# Patient Record
Sex: FEMALE | Race: BLACK OR AFRICAN AMERICAN | Employment: FULL TIME | URBAN - METROPOLITAN AREA
[De-identification: names, ages, dates, MRNs, and addresses within clinical notes are randomized per-mention and may not be internally consistent; named-entity substitution may affect disease eponyms.]

---

## 2020-02-21 ENCOUNTER — HOSPITAL ENCOUNTER (EMERGENCY)
Age: 65
Discharge: HOME | End: 2020-02-21
Attending: EMERGENCY MEDICINE
Payer: COMMERCIAL

## 2020-02-21 ENCOUNTER — APPOINTMENT (OUTPATIENT)
Dept: GENERAL RADIOLOGY | Age: 65
End: 2020-02-21
Payer: COMMERCIAL

## 2020-02-21 VITALS
HEIGHT: 67 IN | TEMPERATURE: 97 F | OXYGEN SATURATION: 96 % | SYSTOLIC BLOOD PRESSURE: 145 MMHG | BODY MASS INDEX: 25.11 KG/M2 | HEART RATE: 58 BPM | DIASTOLIC BLOOD PRESSURE: 84 MMHG | RESPIRATION RATE: 18 BRPM | WEIGHT: 160 LBS

## 2020-02-21 LAB
ABSOLUTE EOS #: <0.03 K/UL (ref 0–0.44)
ABSOLUTE IMMATURE GRANULOCYTE: <0.03 K/UL (ref 0–0.3)
ABSOLUTE LYMPH #: 1.06 K/UL (ref 1.1–3.7)
ABSOLUTE MONO #: 0.74 K/UL (ref 0.1–1.2)
ALBUMIN SERPL-MCNC: 3.8 G/DL (ref 3.5–5.2)
ALBUMIN/GLOBULIN RATIO: 1 (ref 1–2.5)
ALP BLD-CCNC: 55 U/L (ref 35–104)
ALT SERPL-CCNC: 30 U/L (ref 5–33)
ANION GAP SERPL CALCULATED.3IONS-SCNC: 15 MMOL/L (ref 9–17)
AST SERPL-CCNC: 29 U/L
BASOPHILS # BLD: 1 % (ref 0–2)
BASOPHILS ABSOLUTE: <0.03 K/UL (ref 0–0.2)
BILIRUB SERPL-MCNC: 0.26 MG/DL (ref 0.3–1.2)
BUN BLDV-MCNC: 15 MG/DL (ref 8–23)
BUN/CREAT BLD: ABNORMAL (ref 9–20)
CALCIUM SERPL-MCNC: 9.1 MG/DL (ref 8.6–10.4)
CHLORIDE BLD-SCNC: 97 MMOL/L (ref 98–107)
CO2: 20 MMOL/L (ref 20–31)
CREAT SERPL-MCNC: 0.74 MG/DL (ref 0.5–0.9)
DIFFERENTIAL TYPE: ABNORMAL
EOSINOPHILS RELATIVE PERCENT: 0 % (ref 1–4)
GFR AFRICAN AMERICAN: >60 ML/MIN
GFR NON-AFRICAN AMERICAN: >60 ML/MIN
GFR SERPL CREATININE-BSD FRML MDRD: ABNORMAL ML/MIN/{1.73_M2}
GFR SERPL CREATININE-BSD FRML MDRD: ABNORMAL ML/MIN/{1.73_M2}
GLUCOSE BLD-MCNC: 97 MG/DL (ref 70–99)
HCT VFR BLD CALC: 47.8 % (ref 36.3–47.1)
HEMOGLOBIN: 15 G/DL (ref 11.9–15.1)
IMMATURE GRANULOCYTES: 1 %
LIPASE: 12 U/L (ref 13–60)
LYMPHOCYTES # BLD: 37 % (ref 24–43)
MCH RBC QN AUTO: 26.7 PG (ref 25.2–33.5)
MCHC RBC AUTO-ENTMCNC: 31.4 G/DL (ref 28.4–34.8)
MCV RBC AUTO: 85.1 FL (ref 82.6–102.9)
MONOCYTES # BLD: 26 % (ref 3–12)
NRBC AUTOMATED: 0 PER 100 WBC
PDW BLD-RTO: 13.2 % (ref 11.8–14.4)
PLATELET # BLD: 189 K/UL (ref 138–453)
PLATELET ESTIMATE: ABNORMAL
PMV BLD AUTO: 10 FL (ref 8.1–13.5)
POTASSIUM SERPL-SCNC: 3.8 MMOL/L (ref 3.7–5.3)
RBC # BLD: 5.62 M/UL (ref 3.95–5.11)
RBC # BLD: ABNORMAL 10*6/UL
SEG NEUTROPHILS: 35 % (ref 36–65)
SEGMENTED NEUTROPHILS ABSOLUTE COUNT: 1.01 K/UL (ref 1.5–8.1)
SODIUM BLD-SCNC: 132 MMOL/L (ref 135–144)
TOTAL PROTEIN: 7.6 G/DL (ref 6.4–8.3)
TROPONIN INTERP: NORMAL
TROPONIN INTERP: NORMAL
TROPONIN T: NORMAL NG/ML
TROPONIN T: NORMAL NG/ML
TROPONIN, HIGH SENSITIVITY: 12 NG/L (ref 0–14)
TROPONIN, HIGH SENSITIVITY: 14 NG/L (ref 0–14)
WBC # BLD: 2.9 K/UL (ref 3.5–11.3)
WBC # BLD: ABNORMAL 10*3/UL

## 2020-02-21 PROCEDURE — 2580000003 HC RX 258: Performed by: EMERGENCY MEDICINE

## 2020-02-21 PROCEDURE — 99283 EMERGENCY DEPT VISIT LOW MDM: CPT

## 2020-02-21 PROCEDURE — 71046 X-RAY EXAM CHEST 2 VIEWS: CPT

## 2020-02-21 PROCEDURE — 84484 ASSAY OF TROPONIN QUANT: CPT

## 2020-02-21 PROCEDURE — 96375 TX/PRO/DX INJ NEW DRUG ADDON: CPT

## 2020-02-21 PROCEDURE — 6370000000 HC RX 637 (ALT 250 FOR IP): Performed by: EMERGENCY MEDICINE

## 2020-02-21 PROCEDURE — 85025 COMPLETE CBC W/AUTO DIFF WBC: CPT

## 2020-02-21 PROCEDURE — 6360000002 HC RX W HCPCS: Performed by: EMERGENCY MEDICINE

## 2020-02-21 PROCEDURE — 96374 THER/PROPH/DIAG INJ IV PUSH: CPT

## 2020-02-21 PROCEDURE — 80053 COMPREHEN METABOLIC PANEL: CPT

## 2020-02-21 PROCEDURE — 93005 ELECTROCARDIOGRAM TRACING: CPT | Performed by: EMERGENCY MEDICINE

## 2020-02-21 PROCEDURE — 96372 THER/PROPH/DIAG INJ SC/IM: CPT

## 2020-02-21 PROCEDURE — 96361 HYDRATE IV INFUSION ADD-ON: CPT

## 2020-02-21 PROCEDURE — 2500000003 HC RX 250 WO HCPCS: Performed by: EMERGENCY MEDICINE

## 2020-02-21 PROCEDURE — 83690 ASSAY OF LIPASE: CPT

## 2020-02-21 RX ORDER — DEXAMETHASONE SODIUM PHOSPHATE 10 MG/ML
10 INJECTION INTRAMUSCULAR; INTRAVENOUS ONCE
Status: COMPLETED | OUTPATIENT
Start: 2020-02-21 | End: 2020-02-21

## 2020-02-21 RX ORDER — BUTALBITAL, ACETAMINOPHEN AND CAFFEINE 300; 40; 50 MG/1; MG/1; MG/1
1 CAPSULE ORAL EVERY 4 HOURS PRN
Qty: 10 CAPSULE | Refills: 0 | Status: SHIPPED | OUTPATIENT
Start: 2020-02-21

## 2020-02-21 RX ORDER — KETOROLAC TROMETHAMINE 30 MG/ML
30 INJECTION, SOLUTION INTRAMUSCULAR; INTRAVENOUS ONCE
Status: COMPLETED | OUTPATIENT
Start: 2020-02-21 | End: 2020-02-21

## 2020-02-21 RX ORDER — DICYCLOMINE HYDROCHLORIDE 10 MG/1
10 CAPSULE ORAL ONCE
Status: COMPLETED | OUTPATIENT
Start: 2020-02-21 | End: 2020-02-21

## 2020-02-21 RX ORDER — ONDANSETRON 4 MG/1
4 TABLET, ORALLY DISINTEGRATING ORAL EVERY 8 HOURS PRN
Qty: 10 TABLET | Refills: 0 | Status: SHIPPED | OUTPATIENT
Start: 2020-02-21

## 2020-02-21 RX ORDER — IBUPROFEN 800 MG/1
800 TABLET ORAL EVERY 8 HOURS PRN
Qty: 30 TABLET | Refills: 0 | Status: SHIPPED | OUTPATIENT
Start: 2020-02-21

## 2020-02-21 RX ORDER — DICYCLOMINE HYDROCHLORIDE 10 MG/1
10 CAPSULE ORAL EVERY 6 HOURS PRN
Qty: 20 CAPSULE | Refills: 0 | Status: SHIPPED | OUTPATIENT
Start: 2020-02-21

## 2020-02-21 RX ORDER — 0.9 % SODIUM CHLORIDE 0.9 %
1000 INTRAVENOUS SOLUTION INTRAVENOUS ONCE
Status: COMPLETED | OUTPATIENT
Start: 2020-02-21 | End: 2020-02-21

## 2020-02-21 RX ORDER — PROCHLORPERAZINE EDISYLATE 5 MG/ML
10 INJECTION INTRAMUSCULAR; INTRAVENOUS ONCE
Status: COMPLETED | OUTPATIENT
Start: 2020-02-21 | End: 2020-02-21

## 2020-02-21 RX ADMIN — DEXAMETHASONE SODIUM PHOSPHATE 10 MG: 10 INJECTION, SOLUTION INTRAMUSCULAR; INTRAVENOUS at 12:21

## 2020-02-21 RX ADMIN — DICYCLOMINE HYDROCHLORIDE 10 MG: 10 CAPSULE ORAL at 12:19

## 2020-02-21 RX ADMIN — SODIUM CHLORIDE 1000 ML: 9 INJECTION, SOLUTION INTRAVENOUS at 12:19

## 2020-02-21 RX ADMIN — FAMOTIDINE 20 MG: 10 INJECTION, SOLUTION INTRAVENOUS at 12:21

## 2020-02-21 RX ADMIN — PROCHLORPERAZINE EDISYLATE 10 MG: 5 INJECTION INTRAMUSCULAR; INTRAVENOUS at 12:22

## 2020-02-21 RX ADMIN — KETOROLAC TROMETHAMINE 30 MG: 30 INJECTION, SOLUTION INTRAMUSCULAR at 12:22

## 2020-02-21 ASSESSMENT — PAIN SCALES - GENERAL
PAINLEVEL_OUTOF10: 10
PAINLEVEL_OUTOF10: 10

## 2020-02-21 ASSESSMENT — PAIN DESCRIPTION - PAIN TYPE: TYPE: ACUTE PAIN

## 2020-02-21 NOTE — ED NOTES
Pt to ed with c/o flu like symptoms. Pt states she has been experiencing body aches, chills, nausea, fatigue, headache. Pt states symptoms started on Tuesday and are not improving. Pt states she has taken tylenol and motrin over the past several days without improvement. Pt states occasionally she feels light headed. Pt family members recently diagnosed with influenza A. Pt denies chest pain, sob, abdominal pain.       Shirley Maya RN  02/21/20 [id number]

## 2020-02-22 NOTE — ED PROVIDER NOTES
[facility]  ED  Emergency Department Encounter  EmergencyMedicine Resident     Pt Fabio Potter  MRN: [id number]  Cherellegfluis 1955  Date of evaluation: 2/21/20  PCP:  Niraj Eckert MD    [address] [place] [address]       Chief Complaint   Patient presents with    Influenza       HISTORY OF PRESENT ILLNESS  (Location/Symptom, Timing/Onset, Context/Setting, Quality, Duration, Modifying Factors, Severity.)      Garland Fuchs is a 60 y.o. female who presents with complaints of flulike illness with persistent symptoms. Patient reports that her symptoms have been ongoing for about 5 to 7 days at this time. And they have are still ongoing and worsening patient feels very dehydrated and drained, fatigued, so she is re-presenting for evaluation and treatment. Patient was just seen about 5 days ago at outside hospital ED with her similar symptoms was not formally tested but was told that she likely had the flu first dose of Tamiflu was given there and patient was to be on Tamiflu as an outpatient, but never filled the prescription she reports. Patient has had subjective fevers and chills, nausea and vomiting, nonbloody. She has had overall muscle weakness, and pain myalgias. She reports decreased urination and defecation, with some diarrhea as well, nonbloody. She has some nasal congestion and rhinorrhea as well as sore throat, minimal cough, nonproductive. Patient reports some shortness of breath with the cough in her symptoms as well. She has a cardiac history of CAD with a prior cath and stent placement about 8 months ago. She is on a baby aspirin and Eliquis for history of atrial fibrillation as well. PAST MEDICAL / SURGICAL / SOCIAL / FAMILY HISTORY      has a past medical history of Atrial fibrillation ([address] has a past surgical history that includes Tubal ligation.     Social History     Socioeconomic History    Marital status: Single     Spouse name: Not on file    Number of children: Not on file    Years of education: Not on file    Highest education level: Not on file   Occupational History    Not on file   Social Needs    Financial resource strain: Not on file    Food insecurity:     Worry: Not on file     Inability: Not on file    Transportation needs:     Medical: Not on file     Non-medical: Not on file   Tobacco Use    Smoking status: Current Some Day Smoker    Smokeless tobacco: Never Used   Substance and Sexual Activity    Alcohol use: Yes    Drug use: Never    Sexual activity: Not on file   Lifestyle    Physical activity:     Days per week: Not on file     Minutes per session: Not on file    Stress: Not on file   Relationships    Social connections:     Talks on phone: Not on file     Gets together: Not on file     Attends Religion service: Not on file     Active member of club or organization: Not on file     Attends meetings of clubs or organizations: Not on file     Relationship status: Not on file    Intimate partner violence:     Fear of current or ex partner: Not on file     Emotionally abused: Not on file     Physically abused: Not on file     Forced sexual activity: Not on file   Other Topics Concern    Not on file   Social History Narrative    Not on file       No family history on file. Allergies:  Benadryl [diphenhydramine]    Home Medications:  Prior to Admission medications    Medication Sig Start Date End Date Taking?  Authorizing Provider   Apixaban (ELIQUIS PO) Take by mouth   Yes Historical Provider, MD   ibuprofen (ADVIL;MOTRIN) 800 MG tablet Take 1 tablet by mouth every 8 hours as needed for Pain 2/21/20  Yes John Quevedo MD   butalbital-APAP-caffeine -40 MG CAPS per capsule Take 1 capsule by mouth every 4 hours as needed for Headaches or Migraine 2/21/20  Yes John Quevedo MD   dicyclomine (BENTYL) 10 MG capsule Take 1 capsule by mouth every 6 hours as needed (cramps) 2/21/20  Yes John Quevedo MD   ondansetron (ZOFRAN ODT) 4 MG disintegrating tablet Take 1 tablet by mouth every 8 hours as needed for Nausea or Vomiting 2/21/20  Yes Inocencio Joya MD       REVIEW OF SYSTEMS    (2-9 systems for level 4, 10 or more for level 5)      Review of Systems   Constitutional: Positive for chills and fever. Negative for activity change and appetite change. HENT: Positive for congestion, rhinorrhea and sore throat. Eyes: Negative for pain and visual disturbance. Respiratory: Positive for cough and shortness of breath. Cardiovascular: Negative for chest pain, palpitations and leg swelling. Gastrointestinal: Positive for diarrhea and nausea. Negative for abdominal pain and vomiting. Endocrine: Negative for polyphagia and polyuria. Genitourinary: Negative for dysuria and frequency. Musculoskeletal: Negative for arthralgias and myalgias. Skin: Negative for rash and wound. Allergic/Immunologic: Negative for environmental allergies and food allergies. Neurological: Positive for light-headedness. Negative for syncope and weakness. Hematological: Negative for adenopathy. Does not bruise/bleed easily. Psychiatric/Behavioral: Negative for confusion. The patient is not nervous/anxious. PHYSICAL EXAM   (up to 7 for level 4, 8 or more for level 5)      INITIAL VITALS:   BP (!) 145/84   Pulse 58   Temp 97 °F (36.1 °C) (Oral)   Resp 18   Ht 5' 7\" (1.702 m)   Wt 160 lb (72.6 kg)   SpO2 96%   BMI 25.06 kg/m²     Physical Exam  Constitutional:       General: She is not in acute distress. Appearance: She is well-developed. HENT:      Head: Normocephalic and atraumatic. Right Ear: Tympanic membrane and ear canal normal.      Left Ear: Tympanic membrane and ear canal normal.      Nose: Congestion present. No nasal tenderness, mucosal edema or rhinorrhea. Mouth/Throat:      Pharynx: Posterior oropharyngeal erythema present. No pharyngeal swelling or oropharyngeal exudate.    Eyes: Conjunctiva/sclera: Conjunctivae normal.      Pupils: Pupils are equal, round, and reactive to light. Neck:      Musculoskeletal: Normal range of motion and neck supple. Cardiovascular:      Rate and Rhythm: Normal rate and regular rhythm. Heart sounds: Normal heart sounds. No murmur. No friction rub. No gallop. Pulmonary:      Effort: Pulmonary effort is normal. No respiratory distress. Breath sounds: Normal breath sounds. No wheezing, rhonchi or rales. Abdominal:      General: Bowel sounds are normal. There is no distension. Palpations: Abdomen is soft. Tenderness: There is no abdominal tenderness. There is no right CVA tenderness, left CVA tenderness, guarding or rebound. Musculoskeletal: Normal range of motion. General: No tenderness. Skin:     General: Skin is warm and dry. Findings: No rash. Neurological:      Mental Status: She is alert and oriented to person, place, and time. GCS: GCS eye subscore is 4. GCS verbal subscore is 5. GCS motor subscore is 6. Cranial Nerves: Cranial nerves are intact. Sensory: Sensation is intact. Motor: Motor function is intact.       Comments: Neuro exam intact   Psychiatric:         Behavior: Behavior normal.         DIFFERENTIAL  DIAGNOSIS     PLAN (LABS / IMAGING / EKG):  Orders Placed This Encounter   Procedures    XR CHEST STANDARD (2 VW)    CBC WITH AUTO DIFFERENTIAL    Comprehensive Metabolic Panel    LIPASE    Troponin    Urinalysis Reflex to Culture    EKG 12 Lead    EKG REPORT       MEDICATIONS ORDERED:  Orders Placed This Encounter   Medications    0.9 % sodium chloride bolus    prochlorperazine (COMPAZINE) injection 10 mg    dexamethasone (DECADRON) injection 10 mg    ketorolac (TORADOL) injection 30 mg    dicyclomine (BENTYL) capsule 10 mg    famotidine (PEPCID) injection 20 mg    ibuprofen (ADVIL;MOTRIN) 800 MG tablet     Sig: Take 1 tablet by mouth every 8 hours as needed for Pain Dispense:  30 tablet     Refill:  0    butalbital-APAP-caffeine -40 MG CAPS per capsule     Sig: Take 1 capsule by mouth every 4 hours as needed for Headaches or Migraine     Dispense:  10 capsule     Refill:  0    dicyclomine (BENTYL) 10 MG capsule     Sig: Take 1 capsule by mouth every 6 hours as needed (cramps)     Dispense:  20 capsule     Refill:  0    ondansetron (ZOFRAN ODT) 4 MG disintegrating tablet     Sig: Take 1 tablet by mouth every 8 hours as needed for Nausea or Vomiting     Dispense:  10 tablet     Refill:  0       DIAGNOSTIC RESULTS / EMERGENCY DEPARTMENT COURSE / MDM     LABS:  Results for orders placed or performed during the hospital encounter of 02/21/20   CBC WITH AUTO DIFFERENTIAL   Result Value Ref Range    WBC 2.9 (L) 3.5 - 11.3 k/uL    RBC 5.62 (H) 3.95 - 5.11 m/uL    Hemoglobin 15.0 11.9 - 15.1 g/dL    Hematocrit 47.8 (H) 36.3 - 47.1 %    MCV 85.1 82.6 - 102.9 fL    MCH 26.7 25.2 - 33.5 pg    MCHC 31.4 28.4 - 34.8 g/dL    RDW 13.2 11.8 - 14.4 %    Platelets 987 119 - 561 k/uL    MPV 10.0 8.1 - 13.5 fL    NRBC Automated 0.0 0.0 per 100 WBC    Differential Type NOT REPORTED     Seg Neutrophils 35 (L) 36 - 65 %    Lymphocytes 37 24 - 43 %    Monocytes 26 (H) 3 - 12 %    Eosinophils % 0 (L) 1 - 4 %    Basophils 1 0 - 2 %    Immature Granulocytes 1 (H) 0 %    Segs Absolute 1.01 (L) 1.50 - 8.10 k/uL    Absolute Lymph # 1.06 (L) 1.10 - 3.70 k/uL    Absolute Mono # 0.74 0.10 - 1.20 k/uL    Absolute Eos # <0.03 0.00 - 0.44 k/uL    Basophils Absolute <0.03 0.00 - 0.20 k/uL    Absolute Immature Granulocyte <0.03 0.00 - 0.30 k/uL    WBC Morphology NOT REPORTED     RBC Morphology NOT REPORTED     Platelet Estimate NOT REPORTED    Comprehensive Metabolic Panel   Result Value Ref Range    Glucose 97 70 - 99 mg/dL    BUN 15 8 - 23 mg/dL    CREATININE 0.74 0.50 - 0.90 mg/dL    Bun/Cre Ratio NOT REPORTED 9 - 20    Calcium 9.1 8.6 - 10.4 mg/dL    Sodium 132 (L) 135 - 144 mmol/L    Potassium 3.8 3.7 who either signs or Co-signs this chart in the absence of a cardiologist.    EMERGENCY DEPARTMENT COURSE:  Patient seen and evaluated. She is laying in the bed and appears to not feel well, however is nontoxic-appearing. On examination, she is in regular rate and rhythm, lungs are clear to auscultation bilaterally. She has no reproducible chest pain, no abdominal pain. Just reports overall generalized muscle achiness. She is neurovascularly intact. Bilateral ear canals and TMs are clear. There is some mild nasal congestion, no rhinorrhea. Oropharynx with minimal erythema, no edema or exudates noted. Cardiac work-up ordered due to patient's history of CAD and recent stenting with complaints of her shortness of breath. On review of the laboratory results, sodium is mildly low at 132, potassium normal creatinine normal no signs of extensive dehydration or kidney injury. Her troponins are at her baseline of 12 and 14. No acute abnormalities noted on review of her EKG or chest x-ray. Patient was given normal saline, Compazine Decadron Toradol Bentyl and Pepcid for complaints of her nausea and her headache. Reevaluation of the patient, she reported good symptom improvement. Discussed the results with her and the treatment plan. Patient voiced understanding and is in agreement with the plan to be discharged home with symptomatic medications. Patient stable and ready for discharge home. PROCEDURES:  None    CONSULTS:  None    CRITICAL CARE:  None    FINAL IMPRESSION      1. Dehydration    2. Viral illness    3. Flu-like symptoms    4. Shortness of breath    5.  Chronic tension-type headache, not intractable          DISPOSITION / PLAN     DISPOSITION Decision To Discharge 02/21/2020 02:14:04 PM      PATIENT REFERRED TO:  MD Niels Lechuga [address] [address]  [phone]    Call in 2 days  As needed, If symptoms worsen      DISCHARGE MEDICATIONS:  Discharge Medication List as of

## 2020-02-23 LAB
EKG ATRIAL RATE: 58 BPM
EKG P AXIS: 15 DEGREES
EKG P-R INTERVAL: 152 MS
EKG Q-T INTERVAL: 400 MS
EKG QRS DURATION: 86 MS
EKG QTC CALCULATION (BAZETT): 392 MS
EKG R AXIS: 8 DEGREES
EKG T AXIS: 23 DEGREES
EKG VENTRICULAR RATE: 58 BPM

## 2020-02-24 ASSESSMENT — ENCOUNTER SYMPTOMS
ABDOMINAL PAIN: 0
SHORTNESS OF BREATH: 1
NAUSEA: 1
SORE THROAT: 1
VOMITING: 0
COUGH: 1
EYE PAIN: 0
DIARRHEA: 1
RHINORRHEA: 1